# Patient Record
Sex: FEMALE | Employment: FULL TIME | URBAN - METROPOLITAN AREA
[De-identification: names, ages, dates, MRNs, and addresses within clinical notes are randomized per-mention and may not be internally consistent; named-entity substitution may affect disease eponyms.]

---

## 2019-02-20 NOTE — PROGRESS NOTES
Genetic Counseling Note        Jamie Bermudez     Appointment Date:  2/20/2019  Referred By: Bassem Akins DO  YOB: 1980  Partner:  Zach Springer    Pregnancy History: No obstetric history on file  Estimated Date of Delivery: 8/29/19  Estimated Gestational Age: 17 weeks 1 day     Genetic Counseling:advanced maternal age    Kyler Antunez is a(n) 34 y o  female who is here to discuss maternal age related risk for aneuploidy    Issues Discussed:  Average population risk: 3-4% in the average pregnancy of serious condition or birth defect  2-3% risk of mental retardation  Not all detected by prenatal testing  Chromosomal: non-disjunction 1/48 overall and 1/85 for Down syndrome for each baby given that two embryos were transferred  Maternal age  Risk of aneuploidy    Options Discussed:  Amniocentesis: risks and limitations discussed  CVS: risks and limitations discussed  Ethnic screening discussed: clinical and genetic basis of CF, SMA, Fragile X and hemoglobinopathies  Level II ultrasound to screen for structural anomalies  Nuchal translucency/1st trimester serum screen: goals and limitations discussed  Serum AFP screen recommended at 15-17 weeks to check for open neural tube defects  Additional Information / Impression / Plan / Tests Ordered:  Kyler Antunez presents for genetic counseling to discuss maternal age related risk for aneuploidy in a suspected fraternal twin gestation conceived by IVF with transfer of 2 embryos  She is accompanied by her   The patient and her  report having pre implantation genetic screening (PGS) with transfer of one 54,XX female and one 47,XY male embryo  Copies of the PGS test results are not in the patient's chart given that testing was performed in their native [facility] and the results are in [facility]  Kyler Antunez is planning to try and get a copy of the results in [place] for her medical record here in the [address]   We discussed that the accuracy of PGS is usually quoted as 95-98% but without a copy of the result it is not possible to confirm  I reviewed with the patient the options regarding prenatal diagnosis for chromosome abnormalities including CVS and amniocentesis  Chorionic villus sampling(CVS) is generally performed between 10 and 12 weeks of pregnancy and amniocentesis is generally performed at 16 weeks or later  Recent literature supports that CVS and amniocentsis both have an associated  procedure related risk of miscarriage of less than 1/300  That risk is expected to be higher in a twin pregnancy  Chromosome results from CVS or amniocentesis are greater than 99 9% accurate  Occasionally a repeat procedure may be necessary due to cell culture failure  Approximately 1% of the time, a mosaic chromosome result from CVS will necessitate a followup amniocentesis  Measurement of AFP from amniotic fluid is able to detect approximately 95% of open neural tube defects  Maternal serum AFP is recommended for patients who elect CVS     We also reviewed the availability and limitations of sequential screening and level II ultrasound evaluation  Sequential screening consisting of first trimester measurement of nuchal translucency combined with first trimester biochemical analysis as well as second trimester biochemical analysis is able to detect approximately 90% of pregnancies in which the fetus has Down syndrome, 90% of pregnancies in which the fetus has trisomy 25 and 80% of pregnancies which appears has an open neural tube defect  Trisomy 18 risk is not able to be assessed in a twin gestation  I also advised this couple that negative PGS results cannot be statistically factored in to the risk assessment from sequential screening  Level II ultrasound evaluation is able to detect  many major physical birth defects and variations in fetal development that may be associated with chromosome abnormalities    Level II ultrasound evaluation is not able to detect all birth defects or health problems  After discussing the available prenatal diagnostic and screening procedures this couple elected to decline prenatal diagnostic testing at this time  They did also elect to decline sequential screening and are planning to pursue nuchal translucency ultrasound and  level 2 ultrasound evaluations only  Nuchal translucency ultrasound scheduled follow genetic counseling session  During our counseling session histories were taken on the patient's family and her 's family both of which were negative for any prior known cases of intellectual disability, birth defects or single gene disorders  Dominic Smith does report that her mother had a diagnosis of breast cancer at the age of 39  She denies any other history of breast or other cancers in her mother's side of the family and no genetic testing has been performed to her knowledge  We discussed the fact that approximately 5-10% of cases of cancer due to an inherited gene mutation conferring an increased susceptibility to particular type a types of cancer and that a breast cancer diagnosis at the age of 39 is suspicious for hereditary risk  Dominic Smith was provided with the contact information for a family cancer risk evaluation which she may elect to pursue in the future  The patient reports being of [facility] in [facility] descent while her  reports being of [facility] in descent  They both deny having any known [employer] ancestry  Carrier screening for CF, SMA, Fragile X, hemoglobinopathies and expanded carrier screening was discussed  This couple believes they may have had some type of carrier screening performed through the reproductive endocrinologist in [facility] and her planning to inquire about those records  If carrier screening was not performed they will further consider that decision  Records do indicate the patient has a normal MCV, MCH and hemoglobin    Hemoglobin electrophoresis is recommended through her referring Ob provider if not previously performed  Lastly, we discussed the fact that it is important to keep in mind that everyone in the general population regardless of age, family history, or medical background has approximately a 3% risk of having a child with some type of her defect, genetic disease or intellectual disability  Currently there are no tests available to rule out all birth defects or health problems                  Time spent with Genetic Counselor: 45 minutes

## 2019-02-21 ENCOUNTER — TRANSCRIBE ORDERS (OUTPATIENT)
Dept: PERINATAL CARE | Facility: CLINIC | Age: 39
End: 2019-02-21

## 2019-02-21 DIAGNOSIS — O09.90 SUPERVISION OF HIGH-RISK PREGNANCY: Primary | ICD-10-CM

## 2019-02-22 ENCOUNTER — OFFICE VISIT (OUTPATIENT)
Dept: PERINATAL CARE | Facility: CLINIC | Age: 39
End: 2019-02-22
Payer: COMMERCIAL

## 2019-02-22 ENCOUNTER — ROUTINE PRENATAL (OUTPATIENT)
Dept: PERINATAL CARE | Facility: CLINIC | Age: 39
End: 2019-02-22
Payer: COMMERCIAL

## 2019-02-22 VITALS
DIASTOLIC BLOOD PRESSURE: 67 MMHG | HEIGHT: 67 IN | HEART RATE: 67 BPM | BODY MASS INDEX: 23.73 KG/M2 | SYSTOLIC BLOOD PRESSURE: 101 MMHG | WEIGHT: 151.2 LBS

## 2019-02-22 VITALS
WEIGHT: 151.2 LBS | SYSTOLIC BLOOD PRESSURE: 101 MMHG | HEIGHT: 67 IN | HEART RATE: 67 BPM | DIASTOLIC BLOOD PRESSURE: 67 MMHG | BODY MASS INDEX: 23.73 KG/M2

## 2019-02-22 DIAGNOSIS — O09.511 ELDERLY PRIMIGRAVIDA IN FIRST TRIMESTER: ICD-10-CM

## 2019-02-22 DIAGNOSIS — Z36.82 ENCOUNTER FOR NUCHAL TRANSLUCENCY TESTING: ICD-10-CM

## 2019-02-22 DIAGNOSIS — O30.041 TWIN PREGNANCY, DICHORIONIC/DIAMNIOTIC, FIRST TRIMESTER: Primary | ICD-10-CM

## 2019-02-22 DIAGNOSIS — O09.811 PREGNANCY RESULTING FROM IN VITRO FERTILIZATION IN FIRST TRIMESTER: ICD-10-CM

## 2019-02-22 DIAGNOSIS — O09.90 SUPERVISION OF HIGH-RISK PREGNANCY: ICD-10-CM

## 2019-02-22 DIAGNOSIS — Z3A.13 13 WEEKS GESTATION OF PREGNANCY: ICD-10-CM

## 2019-02-22 DIAGNOSIS — O09.521 MATERNAL AGE > 35, MULTIGRAVIDA, FIRST TRIMESTER: Primary | ICD-10-CM

## 2019-02-22 DIAGNOSIS — O30.041 DICHORIONIC DIAMNIOTIC TWIN PREGNANCY IN FIRST TRIMESTER: ICD-10-CM

## 2019-02-22 PROCEDURE — 76801 OB US < 14 WKS SINGLE FETUS: CPT | Performed by: OBSTETRICS & GYNECOLOGY

## 2019-02-22 PROCEDURE — 76813 OB US NUCHAL MEAS 1 GEST: CPT | Performed by: OBSTETRICS & GYNECOLOGY

## 2019-02-22 PROCEDURE — 76802 OB US < 14 WKS ADDL FETUS: CPT | Performed by: OBSTETRICS & GYNECOLOGY

## 2019-02-22 PROCEDURE — 99241 PR OFFICE CONSULTATION NEW/ESTAB PATIENT 15 MIN: CPT | Performed by: OBSTETRICS & GYNECOLOGY

## 2019-02-22 PROCEDURE — 76814 OB US NUCHAL MEAS ADD-ON: CPT | Performed by: OBSTETRICS & GYNECOLOGY

## 2019-02-22 RX ORDER — ASPIRIN 81 MG/1
162 TABLET ORAL DAILY
Qty: 180 TABLET | Refills: 3 | Status: SHIPPED | OUTPATIENT
Start: 2019-02-22

## 2019-02-22 NOTE — PROGRESS NOTES
I have reviewed the notes, assessments, and plan by Gunjan Esteves, I concur with her documentation of Ventura Craft

## 2019-02-22 NOTE — PROGRESS NOTES
The patient was seen today for an ultrasound  Please see ultrasound report (located under Ob Procedures) for additional details  Thank you very much for allowing us to participate in the care of this very nice patient  Should you have any questions, please do not hesitate to contact me  Jorden Grace MD [address] [facility]  Attending Physician, Natasha

## 2019-04-12 ENCOUNTER — ULTRASOUND (OUTPATIENT)
Dept: PERINATAL CARE | Facility: CLINIC | Age: 39
End: 2019-04-12
Payer: COMMERCIAL

## 2019-04-12 VITALS
WEIGHT: 156.4 LBS | BODY MASS INDEX: 24.55 KG/M2 | DIASTOLIC BLOOD PRESSURE: 60 MMHG | HEIGHT: 67 IN | HEART RATE: 70 BPM | SYSTOLIC BLOOD PRESSURE: 100 MMHG

## 2019-04-12 DIAGNOSIS — O09.812 PREGNANCY RESULTING FROM IN VITRO FERTILIZATION IN SECOND TRIMESTER: ICD-10-CM

## 2019-04-12 DIAGNOSIS — O09.522 ELDERLY MULTIGRAVIDA IN SECOND TRIMESTER: ICD-10-CM

## 2019-04-12 DIAGNOSIS — Z3A.20 20 WEEKS GESTATION OF PREGNANCY: ICD-10-CM

## 2019-04-12 DIAGNOSIS — Z36.3 ENCOUNTER FOR ANTENATAL SCREENING FOR MALFORMATIONS: ICD-10-CM

## 2019-04-12 DIAGNOSIS — O30.042 DICHORIONIC DIAMNIOTIC TWIN PREGNANCY IN SECOND TRIMESTER: Primary | ICD-10-CM

## 2019-04-12 DIAGNOSIS — Z36.86 ENCOUNTER FOR ANTENATAL SCREENING FOR CERVICAL LENGTH: ICD-10-CM

## 2019-04-12 PROCEDURE — 76817 TRANSVAGINAL US OBSTETRIC: CPT | Performed by: OBSTETRICS & GYNECOLOGY

## 2019-04-12 PROCEDURE — 76811 OB US DETAILED SNGL FETUS: CPT | Performed by: OBSTETRICS & GYNECOLOGY

## 2019-04-12 PROCEDURE — 76812 OB US DETAILED ADDL FETUS: CPT | Performed by: OBSTETRICS & GYNECOLOGY

## 2019-04-12 PROCEDURE — 99213 OFFICE O/P EST LOW 20 MIN: CPT | Performed by: OBSTETRICS & GYNECOLOGY

## 2019-04-12 RX ORDER — DOCUSATE SODIUM 100 MG/1
100 CAPSULE, LIQUID FILLED ORAL AS NEEDED
COMMUNITY

## 2019-04-12 RX ORDER — LANOLIN ALCOHOL/MO/W.PET/CERES
400 CREAM (GRAM) TOPICAL DAILY
COMMUNITY

## 2019-05-03 ENCOUNTER — ROUTINE PRENATAL (OUTPATIENT)
Dept: PERINATAL CARE | Facility: CLINIC | Age: 39
End: 2019-05-03
Payer: COMMERCIAL

## 2019-05-03 VITALS
DIASTOLIC BLOOD PRESSURE: 68 MMHG | HEART RATE: 70 BPM | SYSTOLIC BLOOD PRESSURE: 124 MMHG | WEIGHT: 169 LBS | BODY MASS INDEX: 26.53 KG/M2 | HEIGHT: 67 IN

## 2019-05-03 DIAGNOSIS — O30.042 DICHORIONIC DIAMNIOTIC TWIN PREGNANCY IN SECOND TRIMESTER: ICD-10-CM

## 2019-05-03 DIAGNOSIS — Z3A.23 23 WEEKS GESTATION OF PREGNANCY: ICD-10-CM

## 2019-05-03 DIAGNOSIS — O09.812 PREGNANCY RESULTING FROM IN VITRO FERTILIZATION IN SECOND TRIMESTER: Primary | ICD-10-CM

## 2019-05-03 PROCEDURE — 76827 ECHO EXAM OF FETAL HEART: CPT | Performed by: OBSTETRICS & GYNECOLOGY

## 2019-05-03 PROCEDURE — 76825 ECHO EXAM OF FETAL HEART: CPT | Performed by: OBSTETRICS & GYNECOLOGY

## 2019-05-03 PROCEDURE — 93325 DOPPLER ECHO COLOR FLOW MAPG: CPT | Performed by: OBSTETRICS & GYNECOLOGY

## 2019-05-10 ENCOUNTER — ULTRASOUND (OUTPATIENT)
Dept: PERINATAL CARE | Facility: CLINIC | Age: 39
End: 2019-05-10
Payer: COMMERCIAL

## 2019-05-10 VITALS
DIASTOLIC BLOOD PRESSURE: 58 MMHG | SYSTOLIC BLOOD PRESSURE: 106 MMHG | HEART RATE: 75 BPM | BODY MASS INDEX: 26.68 KG/M2 | HEIGHT: 67 IN | WEIGHT: 170 LBS

## 2019-05-10 DIAGNOSIS — O30.042 DICHORIONIC DIAMNIOTIC TWIN PREGNANCY IN SECOND TRIMESTER: Primary | ICD-10-CM

## 2019-05-10 DIAGNOSIS — Z3A.24 24 WEEKS GESTATION OF PREGNANCY: ICD-10-CM

## 2019-05-10 DIAGNOSIS — Z36.89 ENCOUNTER FOR ULTRASOUND TO CHECK FETAL GROWTH: ICD-10-CM

## 2019-05-10 DIAGNOSIS — O09.522 ELDERLY MULTIGRAVIDA IN SECOND TRIMESTER: ICD-10-CM

## 2019-05-10 DIAGNOSIS — O09.812 PREGNANCY RESULTING FROM IN VITRO FERTILIZATION IN SECOND TRIMESTER: ICD-10-CM

## 2019-05-10 PROCEDURE — 76816 OB US FOLLOW-UP PER FETUS: CPT | Performed by: OBSTETRICS & GYNECOLOGY

## 2019-06-14 ENCOUNTER — ULTRASOUND (OUTPATIENT)
Dept: PERINATAL CARE | Facility: CLINIC | Age: 39
End: 2019-06-14
Payer: COMMERCIAL

## 2019-06-14 VITALS
HEIGHT: 67 IN | HEART RATE: 80 BPM | BODY MASS INDEX: 28.56 KG/M2 | SYSTOLIC BLOOD PRESSURE: 100 MMHG | WEIGHT: 182 LBS | DIASTOLIC BLOOD PRESSURE: 60 MMHG

## 2019-06-14 DIAGNOSIS — Z3A.29 29 WEEKS GESTATION OF PREGNANCY: ICD-10-CM

## 2019-06-14 DIAGNOSIS — O30.043 DICHORIONIC DIAMNIOTIC TWIN PREGNANCY IN THIRD TRIMESTER: Primary | ICD-10-CM

## 2019-06-14 DIAGNOSIS — Z36.89 ENCOUNTER FOR ULTRASOUND TO CHECK FETAL GROWTH: ICD-10-CM

## 2019-06-14 PROBLEM — Z3A.24 24 WEEKS GESTATION OF PREGNANCY: Status: RESOLVED | Noted: 2019-02-22 | Resolved: 2019-06-14

## 2019-06-14 PROCEDURE — 76816 OB US FOLLOW-UP PER FETUS: CPT | Performed by: OBSTETRICS & GYNECOLOGY

## 2019-07-15 ENCOUNTER — ULTRASOUND (OUTPATIENT)
Dept: PERINATAL CARE | Facility: CLINIC | Age: 39
End: 2019-07-15
Payer: COMMERCIAL

## 2019-07-15 VITALS
HEIGHT: 67 IN | DIASTOLIC BLOOD PRESSURE: 68 MMHG | HEART RATE: 86 BPM | WEIGHT: 192 LBS | BODY MASS INDEX: 30.13 KG/M2 | SYSTOLIC BLOOD PRESSURE: 104 MMHG

## 2019-07-15 DIAGNOSIS — O30.043 DICHORIONIC DIAMNIOTIC TWIN PREGNANCY IN THIRD TRIMESTER: Primary | ICD-10-CM

## 2019-07-15 DIAGNOSIS — Z3A.33 33 WEEKS GESTATION OF PREGNANCY: ICD-10-CM

## 2019-07-15 DIAGNOSIS — O09.813 PREGNANCY RESULTING FROM IN VITRO FERTILIZATION IN THIRD TRIMESTER: ICD-10-CM

## 2019-07-15 PROCEDURE — 76816 OB US FOLLOW-UP PER FETUS: CPT | Performed by: OBSTETRICS & GYNECOLOGY

## 2019-07-15 PROCEDURE — 99212 OFFICE O/P EST SF 10 MIN: CPT | Performed by: OBSTETRICS & GYNECOLOGY

## 2019-07-15 NOTE — PROGRESS NOTES
Please refer to the [facility] ultrasound report in Ob Procedures for additional information regarding the visit to the [facility]  today